# Patient Record
Sex: MALE | Race: WHITE | NOT HISPANIC OR LATINO | Employment: FULL TIME | URBAN - METROPOLITAN AREA
[De-identification: names, ages, dates, MRNs, and addresses within clinical notes are randomized per-mention and may not be internally consistent; named-entity substitution may affect disease eponyms.]

---

## 2017-08-11 ENCOUNTER — PREP FOR SURGERY (OUTPATIENT)
Dept: OTHER | Facility: HOSPITAL | Age: 62
End: 2017-08-11

## 2017-08-11 DIAGNOSIS — Z80.0 FAMILY HISTORY OF COLON CANCER: Primary | ICD-10-CM

## 2017-08-11 DIAGNOSIS — Z86.010 HISTORY OF COLON POLYPS: ICD-10-CM

## 2017-08-25 PROBLEM — Z80.0 FAMILY HISTORY OF COLON CANCER: Status: ACTIVE | Noted: 2017-08-25

## 2017-08-25 PROBLEM — Z86.010 HISTORY OF COLON POLYPS: Status: ACTIVE | Noted: 2017-08-25

## 2017-08-25 PROBLEM — Z86.0100 HISTORY OF COLON POLYPS: Status: ACTIVE | Noted: 2017-08-25

## 2017-11-29 ENCOUNTER — ANESTHESIA (OUTPATIENT)
Dept: GASTROENTEROLOGY | Facility: HOSPITAL | Age: 62
End: 2017-11-29

## 2017-11-29 ENCOUNTER — HOSPITAL ENCOUNTER (OUTPATIENT)
Facility: HOSPITAL | Age: 62
Setting detail: HOSPITAL OUTPATIENT SURGERY
Discharge: HOME | End: 2017-11-29
Attending: SURGERY | Admitting: SURGERY

## 2017-11-29 ENCOUNTER — ANESTHESIA EVENT (OUTPATIENT)
Dept: GASTROENTEROLOGY | Facility: HOSPITAL | Age: 62
End: 2017-11-29

## 2017-11-29 VITALS
HEIGHT: 69 IN | OXYGEN SATURATION: 97 % | DIASTOLIC BLOOD PRESSURE: 85 MMHG | HEART RATE: 55 BPM | RESPIRATION RATE: 16 BRPM | SYSTOLIC BLOOD PRESSURE: 128 MMHG | TEMPERATURE: 98.1 F | BODY MASS INDEX: 29.33 KG/M2 | WEIGHT: 198 LBS

## 2017-11-29 DIAGNOSIS — Z86.010 HISTORY OF COLON POLYPS: ICD-10-CM

## 2017-11-29 DIAGNOSIS — Z80.0 FAMILY HISTORY OF COLON CANCER: ICD-10-CM

## 2017-11-29 PROCEDURE — 88305 TISSUE EXAM BY PATHOLOGIST: CPT | Performed by: SURGERY

## 2017-11-29 PROCEDURE — 25010000002 PROPOFOL 10 MG/ML EMULSION: Performed by: ANESTHESIOLOGY

## 2017-11-29 PROCEDURE — 45385 COLONOSCOPY W/LESION REMOVAL: CPT | Performed by: SURGERY

## 2017-11-29 RX ORDER — LIDOCAINE HYDROCHLORIDE 20 MG/ML
INJECTION, SOLUTION INFILTRATION; PERINEURAL AS NEEDED
Status: DISCONTINUED | OUTPATIENT
Start: 2017-11-29 | End: 2017-11-29 | Stop reason: SURG

## 2017-11-29 RX ORDER — SODIUM CHLORIDE, SODIUM LACTATE, POTASSIUM CHLORIDE, CALCIUM CHLORIDE 600; 310; 30; 20 MG/100ML; MG/100ML; MG/100ML; MG/100ML
30 INJECTION, SOLUTION INTRAVENOUS CONTINUOUS PRN
Status: DISCONTINUED | OUTPATIENT
Start: 2017-11-29 | End: 2017-11-29 | Stop reason: HOSPADM

## 2017-11-29 RX ORDER — PROPOFOL 10 MG/ML
VIAL (ML) INTRAVENOUS AS NEEDED
Status: DISCONTINUED | OUTPATIENT
Start: 2017-11-29 | End: 2017-11-29 | Stop reason: SURG

## 2017-11-29 RX ADMIN — LIDOCAINE HYDROCHLORIDE 100 MG: 20 INJECTION, SOLUTION INFILTRATION; PERINEURAL at 10:42

## 2017-11-29 RX ADMIN — PROPOFOL 200 MG: 10 INJECTION, EMULSION INTRAVENOUS at 10:50

## 2017-11-29 RX ADMIN — PROPOFOL 200 MG: 10 INJECTION, EMULSION INTRAVENOUS at 10:45

## 2017-11-29 RX ADMIN — PROPOFOL 200 MG: 10 INJECTION, EMULSION INTRAVENOUS at 10:42

## 2017-11-29 RX ADMIN — SODIUM CHLORIDE, POTASSIUM CHLORIDE, SODIUM LACTATE AND CALCIUM CHLORIDE 30 ML/HR: 600; 310; 30; 20 INJECTION, SOLUTION INTRAVENOUS at 10:18

## 2017-11-29 NOTE — ANESTHESIA PREPROCEDURE EVALUATION
Anesthesia Evaluation     Patient summary reviewed and Nursing notes reviewed   NPO Solid Status: > 8 hours  NPO Liquid Status: > 4 hours     Airway   Mallampati: I  TM distance: >3 FB  Neck ROM: full  no difficulty expected  Dental - normal exam     Pulmonary - negative pulmonary ROS and normal exam   Cardiovascular - negative cardio ROS and normal exam        Neuro/Psych- negative ROS  GI/Hepatic/Renal/Endo - negative ROS     Musculoskeletal (-) negative ROS    Abdominal  - normal exam    Bowel sounds: normal.   Substance History - negative use     OB/GYN negative ob/gyn ROS         Other                                      Anesthesia Plan    ASA 1     MAC     Anesthetic plan and risks discussed with patient.

## 2017-11-29 NOTE — ANESTHESIA POSTPROCEDURE EVALUATION
"Patient: Ko Cuevas    Procedure Summary     Date Anesthesia Start Anesthesia Stop Room / Location    11/29/17 1038 1059  [facility] /  [facility]       Procedure Diagnosis Surgeon Provider    COLONOSCOPY to CECUM AND TI with HOT SNARE POLYPECTOMY  (N/A ) Family history of colon cancer; History of colon polyps  (Family history of colon cancer [Z80.0]; History of colon polyps [Z86.010]) MD Kimberly Douglass MD          Anesthesia Type: MAC  Last vitals  BP   128/85 (11/29/17 1120)   Temp   36.7 °C (98.1 °F) (11/29/17 1012)   Pulse   55 (11/29/17 1120)   Resp   16 (11/29/17 1120)     SpO2   97 % (11/29/17 1120)     Post Anesthesia Care and Evaluation    Patient location during evaluation: PACU  Patient participation: complete - patient participated  Level of consciousness: awake  Pain score: 0  Pain management: adequate  Airway patency: patent  Anesthetic complications: No anesthetic complications    Cardiovascular status: acceptable  Respiratory status: acceptable  Hydration status: acceptable    Comments: Blood pressure 128/85, pulse 55, temperature 36.7 °C (98.1 °F), temperature source Oral, resp. rate 16, height 68.5\" (174 cm), weight 198 lb (89.8 kg), SpO2 97 %.    No anesthesia care post op    "

## 2017-11-30 LAB
LAB AP CASE REPORT: NORMAL
Lab: NORMAL
PATH REPORT.FINAL DX SPEC: NORMAL
PATH REPORT.GROSS SPEC: NORMAL

## 2017-12-04 ENCOUNTER — DOCUMENTATION (OUTPATIENT)
Dept: SURGERY | Facility: CLINIC | Age: 62
End: 2017-12-04

## 2017-12-04 NOTE — PROGRESS NOTES
Colonoscopy 11/29/17 (performed secondary to personal history of polyps and family history of colon cancer in father) demonstrated sigmoid colon polyp.  Pathology tubular adenoma. Recommendation: five year surveillance

## 2017-12-05 ENCOUNTER — TELEPHONE (OUTPATIENT)
Dept: SURGERY | Facility: CLINIC | Age: 62
End: 2017-12-05

## 2017-12-05 NOTE — TELEPHONE ENCOUNTER
----- Message from Jose Roberto Allan MD sent at 12/4/2017 12:07 PM EST -----  Please inform him that he had a single benign polyp, five year follow up recommended-put in computer for reminder

## 2020-12-24 ENCOUNTER — HOSPITAL ENCOUNTER (OUTPATIENT)
Dept: CARDIOLOGY | Facility: HOSPITAL | Age: 65
Discharge: HOME | End: 2020-12-24

## 2020-12-24 VITALS — HEIGHT: 69 IN | BODY MASS INDEX: 30.04 KG/M2 | WEIGHT: 202.82 LBS

## 2020-12-24 VITALS
HEIGHT: 69 IN | SYSTOLIC BLOOD PRESSURE: 140 MMHG | HEART RATE: 49 BPM | OXYGEN SATURATION: 98 % | WEIGHT: 202 LBS | DIASTOLIC BLOOD PRESSURE: 73 MMHG | TEMPERATURE: 97.5 F | BODY MASS INDEX: 29.92 KG/M2

## 2020-12-24 DIAGNOSIS — R06.02 SHORTNESS OF BREATH: ICD-10-CM

## 2020-12-24 DIAGNOSIS — R07.2 PRECORDIAL PAIN: Primary | ICD-10-CM

## 2020-12-24 DIAGNOSIS — R07.9 CHEST PAIN, UNSPECIFIED TYPE: ICD-10-CM

## 2020-12-24 LAB
ALBUMIN SERPL-MCNC: 4 G/DL (ref 3.5–5.2)
ALBUMIN/GLOB SERPL: 1.7 G/DL
ALP SERPL-CCNC: 59 U/L (ref 39–117)
ALT SERPL W P-5'-P-CCNC: 22 U/L (ref 1–41)
ANION GAP SERPL CALCULATED.3IONS-SCNC: 7.2 MMOL/L (ref 5–15)
AST SERPL-CCNC: 13 U/L (ref 1–40)
BASOPHILS # BLD AUTO: 0.04 10*3/MM3 (ref 0–0.2)
BASOPHILS NFR BLD AUTO: 0.6 % (ref 0–1.5)
BH CV NUCLEAR PRIOR STUDY: 3
BH CV STRESS BP STAGE 1: NORMAL
BH CV STRESS BP STAGE 2: NORMAL
BH CV STRESS BP STAGE 3: NORMAL
BH CV STRESS BP STAGE 4: NORMAL
BH CV STRESS DURATION MIN STAGE 1: 3
BH CV STRESS DURATION MIN STAGE 2: 3
BH CV STRESS DURATION MIN STAGE 3: 3
BH CV STRESS DURATION SEC STAGE 1: 0
BH CV STRESS DURATION SEC STAGE 2: 0
BH CV STRESS DURATION SEC STAGE 3: 0
BH CV STRESS GRADE STAGE 1: 10
BH CV STRESS GRADE STAGE 2: 12
BH CV STRESS GRADE STAGE 3: 14
BH CV STRESS HR STAGE 1: 86
BH CV STRESS HR STAGE 2: 121
BH CV STRESS HR STAGE 3: 140
BH CV STRESS METS STAGE 1: 5
BH CV STRESS METS STAGE 2: 7.5
BH CV STRESS METS STAGE 3: 10
BH CV STRESS PROTOCOL 1: NORMAL
BH CV STRESS RECOVERY BP: NORMAL MMHG
BH CV STRESS RECOVERY HR: 85 BPM
BH CV STRESS SPEED STAGE 1: 1.7
BH CV STRESS SPEED STAGE 2: 2.5
BH CV STRESS SPEED STAGE 3: 3.4
BH CV STRESS STAGE 1: 1
BH CV STRESS STAGE 2: 2
BH CV STRESS STAGE 3: 3
BH CV STRESS STAGE 4: NORMAL
BILIRUB SERPL-MCNC: 0.6 MG/DL (ref 0–1.2)
BUN SERPL-MCNC: 23 MG/DL (ref 8–23)
BUN/CREAT SERPL: 24.2 (ref 7–25)
CALCIUM SPEC-SCNC: 8.8 MG/DL (ref 8.6–10.5)
CHLORIDE SERPL-SCNC: 106 MMOL/L (ref 98–107)
CO2 SERPL-SCNC: 24.8 MMOL/L (ref 22–29)
CREAT SERPL-MCNC: 0.95 MG/DL (ref 0.76–1.27)
D DIMER PPP FEU-MCNC: <0.27 MCGFEU/ML (ref 0–0.49)
DEPRECATED RDW RBC AUTO: 39.7 FL (ref 37–54)
EOSINOPHIL # BLD AUTO: 0.25 10*3/MM3 (ref 0–0.4)
EOSINOPHIL NFR BLD AUTO: 3.6 % (ref 0.3–6.2)
ERYTHROCYTE [DISTWIDTH] IN BLOOD BY AUTOMATED COUNT: 12.7 % (ref 12.3–15.4)
GFR SERPL CREATININE-BSD FRML MDRD: 79 ML/MIN/1.73
GLOBULIN UR ELPH-MCNC: 2.3 GM/DL
GLUCOSE SERPL-MCNC: 104 MG/DL (ref 65–99)
HCT VFR BLD AUTO: 43.4 % (ref 37.5–51)
HGB BLD-MCNC: 15.3 G/DL (ref 13–17.7)
IMM GRANULOCYTES # BLD AUTO: 0.11 10*3/MM3 (ref 0–0.05)
IMM GRANULOCYTES NFR BLD AUTO: 1.6 % (ref 0–0.5)
LV EF NUC BP: 58 %
LYMPHOCYTES # BLD AUTO: 1.18 10*3/MM3 (ref 0.7–3.1)
LYMPHOCYTES NFR BLD AUTO: 17.2 % (ref 19.6–45.3)
MAXIMAL PREDICTED HEART RATE: 154 BPM
MCH RBC QN AUTO: 30.7 PG (ref 26.6–33)
MCHC RBC AUTO-ENTMCNC: 35.3 G/DL (ref 31.5–35.7)
MCV RBC AUTO: 87.1 FL (ref 79–97)
MONOCYTES # BLD AUTO: 0.65 10*3/MM3 (ref 0.1–0.9)
MONOCYTES NFR BLD AUTO: 9.5 % (ref 5–12)
NEUTROPHILS NFR BLD AUTO: 4.64 10*3/MM3 (ref 1.7–7)
NEUTROPHILS NFR BLD AUTO: 67.5 % (ref 42.7–76)
NRBC BLD AUTO-RTO: 0 /100 WBC (ref 0–0.2)
NT-PROBNP SERPL-MCNC: 43 PG/ML (ref 0–900)
PERCENT MAX PREDICTED HR: 90.91 %
PLATELET # BLD AUTO: 172 10*3/MM3 (ref 140–450)
PMV BLD AUTO: 11.4 FL (ref 6–12)
POTASSIUM SERPL-SCNC: 4.5 MMOL/L (ref 3.5–5.2)
PROT SERPL-MCNC: 6.3 G/DL (ref 6–8.5)
RBC # BLD AUTO: 4.98 10*6/MM3 (ref 4.14–5.8)
SODIUM SERPL-SCNC: 138 MMOL/L (ref 136–145)
STRESS BASELINE BP: NORMAL MMHG
STRESS BASELINE HR: 52 BPM
STRESS PERCENT HR: 107 %
STRESS POST ESTIMATED WORKLOAD: 10 METS
STRESS POST EXERCISE DUR MIN: 9 MIN
STRESS POST EXERCISE DUR SEC: 0 SEC
STRESS POST PEAK BP: NORMAL MMHG
STRESS POST PEAK HR: 140 BPM
STRESS TARGET HR: 131 BPM
TROPONIN T SERPL-MCNC: <0.01 NG/ML (ref 0–0.03)
WBC # BLD AUTO: 6.87 10*3/MM3 (ref 3.4–10.8)

## 2020-12-24 PROCEDURE — 94760 N-INVAS EAR/PLS OXIMETRY 1: CPT

## 2020-12-24 PROCEDURE — 83880 ASSAY OF NATRIURETIC PEPTIDE: CPT | Performed by: INTERNAL MEDICINE

## 2020-12-24 PROCEDURE — 93010 ELECTROCARDIOGRAM REPORT: CPT | Performed by: INTERNAL MEDICINE

## 2020-12-24 PROCEDURE — 99204 OFFICE O/P NEW MOD 45 MIN: CPT | Performed by: INTERNAL MEDICINE

## 2020-12-24 PROCEDURE — A9502 TC99M TETROFOSMIN: HCPCS | Performed by: INTERNAL MEDICINE

## 2020-12-24 PROCEDURE — 80053 COMPREHEN METABOLIC PANEL: CPT

## 2020-12-24 PROCEDURE — 93016 CV STRESS TEST SUPVJ ONLY: CPT | Performed by: INTERNAL MEDICINE

## 2020-12-24 PROCEDURE — 36415 COLL VENOUS BLD VENIPUNCTURE: CPT

## 2020-12-24 PROCEDURE — 93017 CV STRESS TEST TRACING ONLY: CPT

## 2020-12-24 PROCEDURE — 93005 ELECTROCARDIOGRAM TRACING: CPT | Performed by: INTERNAL MEDICINE

## 2020-12-24 PROCEDURE — 0 TECHNETIUM TETROFOSMIN KIT: Performed by: INTERNAL MEDICINE

## 2020-12-24 PROCEDURE — 78452 HT MUSCLE IMAGE SPECT MULT: CPT | Performed by: INTERNAL MEDICINE

## 2020-12-24 PROCEDURE — 84484 ASSAY OF TROPONIN QUANT: CPT | Performed by: INTERNAL MEDICINE

## 2020-12-24 PROCEDURE — 85025 COMPLETE CBC W/AUTO DIFF WBC: CPT

## 2020-12-24 PROCEDURE — 85379 FIBRIN DEGRADATION QUANT: CPT | Performed by: INTERNAL MEDICINE

## 2020-12-24 PROCEDURE — 78452 HT MUSCLE IMAGE SPECT MULT: CPT

## 2020-12-24 PROCEDURE — 93018 CV STRESS TEST I&R ONLY: CPT | Performed by: INTERNAL MEDICINE

## 2020-12-24 RX ORDER — SODIUM CHLORIDE 0.9 % (FLUSH) 0.9 %
10 SYRINGE (ML) INJECTION AS NEEDED
Status: SHIPPED | OUTPATIENT
Start: 2020-12-24

## 2020-12-24 RX ORDER — NITROGLYCERIN 0.4 MG/1
0.4 TABLET SUBLINGUAL
Status: SHIPPED | OUTPATIENT
Start: 2020-12-24

## 2020-12-24 RX ORDER — FAMOTIDINE 20 MG/1
20 TABLET, FILM COATED ORAL 2 TIMES DAILY
COMMUNITY

## 2020-12-24 RX ADMIN — TETROFOSMIN 1 DOSE: 1.38 INJECTION, POWDER, LYOPHILIZED, FOR SOLUTION INTRAVENOUS at 10:44

## 2020-12-24 RX ADMIN — TETROFOSMIN 1 DOSE: 1.38 INJECTION, POWDER, LYOPHILIZED, FOR SOLUTION INTRAVENOUS at 11:28

## 2020-12-24 NOTE — PROGRESS NOTES
Date of Office Visit: 2020  Encounter Provider: Lit Murrell MD  Place of Service: [facility] CARDIOLOGY  Patient Name: Ko Cuevas  :1955    Chief complaint: Chest pain    History of Present Illness:    I had the pleasure of seeing the patient in our [facility] unit on 2020.  He is a very pleasant 65 year-old with a history of hypertension, tobacco use, and family history of coronary artery disease who presents for evaluation.  The patient states that for the last several weeks, he has had intermittent chest discomfort which she describes as a sharp pain in his left upper chest, and occasionally in the right upper chest.  He mainly feels it when he is not active, although it was fairly intense yesterday, and lasted for most of the day.  He has also had some mild worsening dyspnea on exertion.  He has smoked intermittently for many years, but quit 2 weeks ago.  His father had 2 separate CABG surgeries, with his first in his 60s.  His father also had CHF.  His EKG does not show any ischemic changes, although he does have a first-degree AV block.  He has not had prior cardiac testing.    Past Medical History:   Diagnosis Date   • Heart murmur    • Hypertension        Past Surgical History:   Procedure Laterality Date   • COLONOSCOPY     • COLONOSCOPY N/A 2017    Procedure: COLONOSCOPY to CECUM AND TI with HOT SNARE POLYPECTOMY ;  Surgeon: Jose Roberto Allan MD;  Location: [facility] ENDOSCOPY;  Service:        Current Outpatient Medications on File Prior to Encounter   Medication Sig Dispense Refill   • famotidine (PEPCID) 20 MG tablet Take 20 mg by mouth 2 (Two) Times a Day.       No current facility-administered medications on file prior to encounter.      Allergies as of 2020 - Reviewed 2020   Allergen Reaction Noted   • Tea tree oil Itching 2020     Social History     Socioeconomic History   • Marital status:      Spouse name: Not on  "file   • Number of children: Not on file   • Years of education: Not on file   • Highest education level: Not on file   Tobacco Use   • Smoking status: Current Some Day Smoker     Packs/day: 0.25   • Smokeless tobacco: Never Used   Substance and Sexual Activity   • Alcohol use: Yes     Alcohol/week: 8.0 standard drinks     Types: 8 Shots of liquor per week   • Drug use: Never   • Sexual activity: Defer     Family History   Problem Relation Age of Onset   • Colon cancer Father    • Heart failure Father    • Coronary artery disease Father         Father with 2 separate CABG surgeries, the first in his 60s   • Liver disease Mother    • Diabetes Sister    • Clotting disorder Brother    • No Known Problems Sister    • No Known Problems Sister    • No Known Problems Sister        Review of Systems   Cardiovascular: Positive for chest pain.   All other systems reviewed and are negative.     Objective:     Vitals:    12/24/20 0855   BP: 140/73   BP Location: Right arm   Patient Position: Sitting   Pulse: (!) 49   Temp: 97.5 °F (36.4 °C)   TempSrc: Temporal   SpO2: 98%   Weight: 91.6 kg (202 lb)   Height: 175.3 cm (69\")     Body mass index is 29.83 kg/m².    Constitutional:       Appearance: Healthy appearance. Well-developed.   Eyes:      Conjunctiva/sclera: Conjunctivae normal.   HENT:      Head: Normocephalic and atraumatic.   Neck:      Musculoskeletal: Neck supple.   Pulmonary:      Effort: Pulmonary effort is normal.      Breath sounds: Normal breath sounds.   Cardiovascular:      Normal rate. Regular rhythm.      Murmurs: There is no murmur.      No gallop.   Abdominal:      Palpations: Abdomen is soft.      Tenderness: There is no abdominal tenderness.   Skin:     General: Skin is warm.   Neurological:      Mental Status: Alert and oriented to person, place, and time.   Psychiatric:         Behavior: Behavior normal.       Lab Review:     ECG 12 Lead    Date/Time: 12/24/2020 11:30 AM  Performed by: Lit Murrell " MD BRIGID  Authorized by: Lit Murrell MD   Comparison: not compared with previous ECG   Previous ECG: no previous ECG available  Rhythm: sinus bradycardia  Rate: bradycardic  BPM: 49  Conduction: 1st degree AV block    Clinical impression: abnormal EKG              Assessment:       Diagnosis Plan   1. Chest pain, unspecified type  Cardiac Monitoring    Vital Signs - Once    Vital Signs - As Needed    Pulse Oximetry    Oxygen Therapy- Nasal Cannula; Titrate for SPO2: 92%, equal to or greater than    Insert Peripheral IV    sodium chloride 0.9 % flush 10 mL    nitroglycerin (NITROSTAT) SL tablet 0.4 mg    NPO Diet    Bathroom Privileges With Assistance    CBC & Differential    Comprehensive Metabolic Panel    Troponin T    D-Dimer    proBNP    ECG 12 Lead    Cardiac Monitoring    Vital Signs - Once    Insert Peripheral IV    NPO Diet    Bathroom Privileges With Assistance    Troponin T    Troponin T    D-Dimer    D-Dimer    proBNP    proBNP    CBC Auto Differential    Stress Test With Myocardial Perfusion One Day   2. Shortness of breath  Cardiac Monitoring    Vital Signs - Once    Vital Signs - As Needed    Pulse Oximetry    Oxygen Therapy- Nasal Cannula; Titrate for SPO2: 92%, equal to or greater than    Insert Peripheral IV    sodium chloride 0.9 % flush 10 mL    nitroglycerin (NITROSTAT) SL tablet 0.4 mg    NPO Diet    Bathroom Privileges With Assistance    CBC & Differential    Comprehensive Metabolic Panel    Troponin T    D-Dimer    proBNP    ECG 12 Lead    Cardiac Monitoring    Vital Signs - Once    Insert Peripheral IV    NPO Diet    Bathroom Privileges With Assistance    Troponin T    Troponin T    D-Dimer    D-Dimer    proBNP    proBNP    CBC Auto Differential    Stress Test With Myocardial Perfusion One Day     Plan:       He has risk factors for coronary artery disease, including a family history and intermittent long-term smoking.  His symptoms are somewhat atypical, although he clearly needs to be  evaluated.  I have recommended an exercise nuclear stress test at this point.  The patient was in agreement with this plan.  Further plans will be made pending the results of the test.

## 2020-12-24 NOTE — ADDENDUM NOTE
Encounter addended by: Lexi Harrington RN on: 12/24/2020 12:43 PM   Actions taken: Flowsheet accepted

## 2021-01-21 ENCOUNTER — HOSPITAL ENCOUNTER (OUTPATIENT)
Dept: CARDIOLOGY | Facility: HOSPITAL | Age: 66
Discharge: HOME | End: 2021-01-21
Admitting: INTERNAL MEDICINE

## 2021-01-21 VITALS
WEIGHT: 203 LBS | DIASTOLIC BLOOD PRESSURE: 74 MMHG | BODY MASS INDEX: 30.07 KG/M2 | SYSTOLIC BLOOD PRESSURE: 150 MMHG | HEIGHT: 69 IN | HEART RATE: 73 BPM

## 2021-01-21 DIAGNOSIS — R07.2 PRECORDIAL PAIN: ICD-10-CM

## 2021-01-21 PROCEDURE — 93306 TTE W/DOPPLER COMPLETE: CPT

## 2021-01-21 PROCEDURE — 93306 TTE W/DOPPLER COMPLETE: CPT | Performed by: INTERNAL MEDICINE

## 2021-01-22 LAB
AORTIC ARCH: 2.9 CM
ASCENDING AORTA: 2.7 CM
BH CV ECHO MEAS - ACS: 2.2 CM
BH CV ECHO MEAS - AO ROOT AREA (BSA CORRECTED): 1.6
BH CV ECHO MEAS - AO ROOT AREA: 8.4 CM^2
BH CV ECHO MEAS - AO ROOT DIAM: 3.3 CM
BH CV ECHO MEAS - ASC AORTA: 3.7 CM
BH CV ECHO MEAS - BSA(HAYCOCK): 2.1 M^2
BH CV ECHO MEAS - BSA: 2.1 M^2
BH CV ECHO MEAS - BZI_BMI: 30 KILOGRAMS/M^2
BH CV ECHO MEAS - BZI_METRIC_HEIGHT: 175.3 CM
BH CV ECHO MEAS - BZI_METRIC_WEIGHT: 92.1 KG
BH CV ECHO MEAS - EDV(MOD-SP2): 77 ML
BH CV ECHO MEAS - EDV(MOD-SP4): 87 ML
BH CV ECHO MEAS - EDV(TEICH): 126.2 ML
BH CV ECHO MEAS - EF(CUBED): 79.1 %
BH CV ECHO MEAS - EF(MOD-BP): 57 %
BH CV ECHO MEAS - EF(MOD-SP2): 57.1 %
BH CV ECHO MEAS - EF(MOD-SP4): 56.3 %
BH CV ECHO MEAS - EF(TEICH): 71.1 %
BH CV ECHO MEAS - ESV(MOD-SP2): 33 ML
BH CV ECHO MEAS - ESV(MOD-SP4): 38 ML
BH CV ECHO MEAS - ESV(TEICH): 36.4 ML
BH CV ECHO MEAS - FS: 40.7 %
BH CV ECHO MEAS - IVS/LVPW: 0.98
BH CV ECHO MEAS - IVSD: 1.3 CM
BH CV ECHO MEAS - LAT PEAK E' VEL: 8.9 CM/SEC
BH CV ECHO MEAS - LV DIASTOLIC VOL/BSA (35-75): 41.9 ML/M^2
BH CV ECHO MEAS - LV MASS(C)D: 265.1 GRAMS
BH CV ECHO MEAS - LV MASS(C)DI: 127.5 GRAMS/M^2
BH CV ECHO MEAS - LV SYSTOLIC VOL/BSA (12-30): 18.3 ML/M^2
BH CV ECHO MEAS - LVIDD: 5.1 CM
BH CV ECHO MEAS - LVIDS: 3.1 CM
BH CV ECHO MEAS - LVLD AP2: 7.4 CM
BH CV ECHO MEAS - LVLD AP4: 6.9 CM
BH CV ECHO MEAS - LVLS AP2: 7.3 CM
BH CV ECHO MEAS - LVLS AP4: 7.1 CM
BH CV ECHO MEAS - LVOT AREA (M): 3.1 CM^2
BH CV ECHO MEAS - LVOT AREA: 3 CM^2
BH CV ECHO MEAS - LVOT DIAM: 2 CM
BH CV ECHO MEAS - LVPWD: 1.3 CM
BH CV ECHO MEAS - MED PEAK E' VEL: 6.7 CM/SEC
BH CV ECHO MEAS - MR MAX PG: 30.1 MMHG
BH CV ECHO MEAS - MR MAX VEL: 274.2 CM/SEC
BH CV ECHO MEAS - MV A DUR: 0.17 SEC
BH CV ECHO MEAS - MV A MAX VEL: 83.6 CM/SEC
BH CV ECHO MEAS - MV DEC SLOPE: 224 CM/SEC^2
BH CV ECHO MEAS - MV DEC TIME: 0.23 SEC
BH CV ECHO MEAS - MV E MAX VEL: 81.8 CM/SEC
BH CV ECHO MEAS - MV E/A: 0.98
BH CV ECHO MEAS - MV MAX PG: 3.7 MMHG
BH CV ECHO MEAS - MV MEAN PG: 1.3 MMHG
BH CV ECHO MEAS - MV P1/2T MAX VEL: 89.6 CM/SEC
BH CV ECHO MEAS - MV P1/2T: 117.2 MSEC
BH CV ECHO MEAS - MV V2 MAX: 96.4 CM/SEC
BH CV ECHO MEAS - MV V2 MEAN: 51.2 CM/SEC
BH CV ECHO MEAS - MV V2 VTI: 31.5 CM
BH CV ECHO MEAS - MVA P1/2T LCG: 2.5 CM^2
BH CV ECHO MEAS - MVA(P1/2T): 1.9 CM^2
BH CV ECHO MEAS - PA ACC TIME: 0.09 SEC
BH CV ECHO MEAS - PA MAX PG (FULL): 7.9 MMHG
BH CV ECHO MEAS - PA MAX PG: 10.1 MMHG
BH CV ECHO MEAS - PA PR(ACCEL): 39.4 MMHG
BH CV ECHO MEAS - PA V2 MAX: 158.8 CM/SEC
BH CV ECHO MEAS - PULM A REVS DUR: 0.12 SEC
BH CV ECHO MEAS - PULM A REVS VEL: 23 CM/SEC
BH CV ECHO MEAS - PULM DIAS VEL: 47.3 CM/SEC
BH CV ECHO MEAS - PULM S/D: 0.8
BH CV ECHO MEAS - PULM SYS VEL: 37.6 CM/SEC
BH CV ECHO MEAS - PVA(V,A): 1.5 CM^2
BH CV ECHO MEAS - PVA(V,D): 1.5 CM^2
BH CV ECHO MEAS - RAP SYSTOLE: 3 MMHG
BH CV ECHO MEAS - RV MAX PG: 2.2 MMHG
BH CV ECHO MEAS - RV MEAN PG: 1.6 MMHG
BH CV ECHO MEAS - RV V1 MAX: 74.1 CM/SEC
BH CV ECHO MEAS - RV V1 MEAN: 62.2 CM/SEC
BH CV ECHO MEAS - RV V1 VTI: 16.2 CM
BH CV ECHO MEAS - RVOT AREA: 3.1 CM^2
BH CV ECHO MEAS - RVOT DIAM: 2 CM
BH CV ECHO MEAS - RVSP: 18 MMHG
BH CV ECHO MEAS - SI(CUBED): 51.8 ML/M^2
BH CV ECHO MEAS - SI(MOD-SP2): 21.2 ML/M^2
BH CV ECHO MEAS - SI(MOD-SP4): 23.6 ML/M^2
BH CV ECHO MEAS - SI(TEICH): 43.2 ML/M^2
BH CV ECHO MEAS - SUP REN AO DIAM: 2.3 CM
BH CV ECHO MEAS - SV(CUBED): 107.7 ML
BH CV ECHO MEAS - SV(MOD-SP2): 44 ML
BH CV ECHO MEAS - SV(MOD-SP4): 49 ML
BH CV ECHO MEAS - SV(RVOT): 50.6 ML
BH CV ECHO MEAS - SV(TEICH): 89.8 ML
BH CV ECHO MEAS - TAPSE (>1.6): 3.3 CM
BH CV ECHO MEAS - TR MAX VEL: 195.5 CM/SEC
BH CV ECHO MEASUREMENTS AVERAGE E/E' RATIO: 10.49
BH CV XLRA - RV BASE: 2.9 CM
BH CV XLRA - RV LENGTH: 7 CM
BH CV XLRA - RV MID: 2.7 CM
BH CV XLRA - TDI S': 14.7 CM/SEC
LEFT ATRIUM VOLUME INDEX: 21 ML/M2
MAXIMAL PREDICTED HEART RATE: 154 BPM
SINUS: 3 CM
STJ: 2.8 CM
STRESS TARGET HR: 131 BPM

## 2021-01-25 DIAGNOSIS — I77.810 ASCENDING AORTA DILATATION (HCC): Primary | ICD-10-CM

## 2021-01-25 NOTE — PROGRESS NOTES
I called and gave him the results.  The echocardiogram looks good other than a mildly dilated aorta.  I am going to check a CT angiogram of the chest.  He also told me that his blood pressure has been running higher recently with systolic readings in the 140s.  He wants to see if this continues before starting on medications.  I told him that ideally, it should be less than 130.  He is going to wait on medications for now, and we can discuss this further after his CT scan.    BHARGAVI

## 2021-02-19 ENCOUNTER — HOSPITAL ENCOUNTER (OUTPATIENT)
Dept: CT IMAGING | Facility: HOSPITAL | Age: 66
Discharge: HOME | End: 2021-02-19
Admitting: INTERNAL MEDICINE

## 2021-02-19 LAB — CREAT BLDA-MCNC: 1.1 MG/DL (ref 0.6–1.3)

## 2021-02-19 PROCEDURE — 82565 ASSAY OF CREATININE: CPT

## 2021-02-19 PROCEDURE — 0 IOPAMIDOL PER 1 ML: Performed by: INTERNAL MEDICINE

## 2021-02-19 PROCEDURE — 71275 CT ANGIOGRAPHY CHEST: CPT

## 2021-02-19 RX ADMIN — IOPAMIDOL 95 ML: 755 INJECTION, SOLUTION INTRAVENOUS at 16:45

## 2021-02-22 DIAGNOSIS — N28.9 KIDNEY LESION, NATIVE, LEFT: Primary | ICD-10-CM

## 2021-03-02 ENCOUNTER — TRANSCRIBE ORDERS (OUTPATIENT)
Dept: ADMINISTRATIVE | Facility: HOSPITAL | Age: 66
End: 2021-03-02

## 2021-03-02 DIAGNOSIS — N28.1 RENAL CYST: Primary | ICD-10-CM

## 2021-03-16 ENCOUNTER — BULK ORDERING (OUTPATIENT)
Dept: CASE MANAGEMENT | Facility: OTHER | Age: 66
End: 2021-03-16

## 2021-03-16 DIAGNOSIS — Z23 IMMUNIZATION DUE: ICD-10-CM

## 2021-03-29 ENCOUNTER — HOSPITAL ENCOUNTER (OUTPATIENT)
Dept: ULTRASOUND IMAGING | Facility: HOSPITAL | Age: 66
Discharge: HOME | End: 2021-03-29
Admitting: INTERNAL MEDICINE

## 2021-03-29 DIAGNOSIS — N28.1 RENAL CYST: ICD-10-CM

## 2021-03-29 PROCEDURE — 76775 US EXAM ABDO BACK WALL LIM: CPT

## 2023-01-16 ENCOUNTER — PREP FOR SURGERY (OUTPATIENT)
Dept: OTHER | Facility: HOSPITAL | Age: 68
End: 2023-01-16
Payer: MEDICARE

## 2023-01-16 DIAGNOSIS — Z80.0 FAMILY HISTORY OF COLON CANCER: Primary | ICD-10-CM

## 2023-01-16 DIAGNOSIS — Z86.010 HISTORY OF ADENOMATOUS POLYP OF COLON: ICD-10-CM

## 2023-05-09 RX ORDER — VALSARTAN 80 MG/1
80 TABLET ORAL DAILY
COMMUNITY

## 2023-05-10 ENCOUNTER — HOSPITAL ENCOUNTER (OUTPATIENT)
Facility: HOSPITAL | Age: 68
Setting detail: HOSPITAL OUTPATIENT SURGERY
Discharge: HOME | End: 2023-05-10
Attending: SURGERY | Admitting: SURGERY
Payer: MEDICARE

## 2023-05-10 ENCOUNTER — ANESTHESIA EVENT (OUTPATIENT)
Dept: GASTROENTEROLOGY | Facility: HOSPITAL | Age: 68
End: 2023-05-10
Payer: MEDICARE

## 2023-05-10 ENCOUNTER — ANESTHESIA (OUTPATIENT)
Dept: GASTROENTEROLOGY | Facility: HOSPITAL | Age: 68
End: 2023-05-10
Payer: MEDICARE

## 2023-05-10 VITALS
SYSTOLIC BLOOD PRESSURE: 121 MMHG | HEIGHT: 68 IN | WEIGHT: 199 LBS | BODY MASS INDEX: 30.16 KG/M2 | DIASTOLIC BLOOD PRESSURE: 70 MMHG | TEMPERATURE: 98.1 F | OXYGEN SATURATION: 100 % | RESPIRATION RATE: 16 BRPM | HEART RATE: 51 BPM

## 2023-05-10 DIAGNOSIS — Z80.0 FAMILY HISTORY OF COLON CANCER: ICD-10-CM

## 2023-05-10 DIAGNOSIS — Z86.010 HISTORY OF ADENOMATOUS POLYP OF COLON: ICD-10-CM

## 2023-05-10 PROBLEM — Z86.0100 HISTORY OF COLON POLYPS: Status: ACTIVE | Noted: 2017-08-25

## 2023-05-10 PROCEDURE — 25010000002 PROPOFOL 10 MG/ML EMULSION: Performed by: NURSE ANESTHETIST, CERTIFIED REGISTERED

## 2023-05-10 PROCEDURE — 88305 TISSUE EXAM BY PATHOLOGIST: CPT | Performed by: SURGERY

## 2023-05-10 PROCEDURE — 45385 COLONOSCOPY W/LESION REMOVAL: CPT | Performed by: SURGERY

## 2023-05-10 PROCEDURE — S0260 H&P FOR SURGERY: HCPCS | Performed by: SURGERY

## 2023-05-10 PROCEDURE — 25010000002 PHENYLEPHRINE 10 MG/ML SOLUTION: Performed by: NURSE ANESTHETIST, CERTIFIED REGISTERED

## 2023-05-10 RX ORDER — PROPOFOL 10 MG/ML
VIAL (ML) INTRAVENOUS AS NEEDED
Status: DISCONTINUED | OUTPATIENT
Start: 2023-05-10 | End: 2023-05-10 | Stop reason: SURG

## 2023-05-10 RX ORDER — PHENYLEPHRINE HYDROCHLORIDE 10 MG/ML
INJECTION INTRAVENOUS AS NEEDED
Status: DISCONTINUED | OUTPATIENT
Start: 2023-05-10 | End: 2023-05-10 | Stop reason: SURG

## 2023-05-10 RX ORDER — SODIUM CHLORIDE, SODIUM LACTATE, POTASSIUM CHLORIDE, CALCIUM CHLORIDE 600; 310; 30; 20 MG/100ML; MG/100ML; MG/100ML; MG/100ML
30 INJECTION, SOLUTION INTRAVENOUS CONTINUOUS
Status: DISCONTINUED | OUTPATIENT
Start: 2023-05-10 | End: 2023-05-10 | Stop reason: HOSPADM

## 2023-05-10 RX ORDER — SODIUM CHLORIDE, SODIUM LACTATE, POTASSIUM CHLORIDE, CALCIUM CHLORIDE 600; 310; 30; 20 MG/100ML; MG/100ML; MG/100ML; MG/100ML
INJECTION, SOLUTION INTRAVENOUS CONTINUOUS PRN
Status: DISCONTINUED | OUTPATIENT
Start: 2023-05-10 | End: 2023-05-10 | Stop reason: SURG

## 2023-05-10 RX ADMIN — SODIUM CHLORIDE, POTASSIUM CHLORIDE, SODIUM LACTATE AND CALCIUM CHLORIDE: 600; 310; 30; 20 INJECTION, SOLUTION INTRAVENOUS at 09:44

## 2023-05-10 RX ADMIN — PROPOFOL 100 MG: 10 INJECTION, EMULSION INTRAVENOUS at 09:44

## 2023-05-10 RX ADMIN — SODIUM CHLORIDE, POTASSIUM CHLORIDE, SODIUM LACTATE AND CALCIUM CHLORIDE 30 ML/HR: 600; 310; 30; 20 INJECTION, SOLUTION INTRAVENOUS at 09:07

## 2023-05-10 RX ADMIN — PHENYLEPHRINE HYDROCHLORIDE 100 MCG: 10 INJECTION INTRAVENOUS at 09:57

## 2023-05-10 RX ADMIN — PROPOFOL 200 MCG/KG/MIN: 10 INJECTION, EMULSION INTRAVENOUS at 09:44

## 2023-05-10 NOTE — ANESTHESIA POSTPROCEDURE EVALUATION
Patient: Ko Cuevas    Procedure Summary     Date: 05/10/23 Room / Location: [facility] 1 / [facility]    Anesthesia Start: 0939 Anesthesia Stop: 1005    Procedure: COLONOSCOPY TO CECUM WITH COLD POLYPECTOMIES Diagnosis:       Family history of colon cancer      History of adenomatous polyp of colon      (Family history of colon cancer [Z80.0])      (History of adenomatous polyp of colon [Z86.010])    Surgeons: Jose Roberto Allan MD Provider: Matteo Cooley MD    Anesthesia Type: MAC ASA Status: 2          Anesthesia Type: MAC    Vitals  Vitals Value Taken Time   /64 05/10/23 1012   Temp 36.7 °C (98.1 °F) 05/10/23 1012   Pulse 53 05/10/23 1012   Resp 16 05/10/23 1012   SpO2 100 % 05/10/23 1012           Post Anesthesia Care and Evaluation    Patient location during evaluation: PACU  Patient participation: complete - patient participated  Level of consciousness: awake and alert  Pain management: adequate    Airway patency: patent  Anesthetic complications: No anesthetic complications    Cardiovascular status: acceptable  Respiratory status: acceptable  Hydration status: acceptable    Comments: ---------------------------               05/10/23                      1012         ---------------------------   BP:          117/64         Pulse:         53           Resp:          16           Temp:   36.7 °C (98.1 °F)   SpO2:         100%         ---------------------------

## 2023-05-10 NOTE — OP NOTE
PREOPERATIVE DIAGNOSIS:  • High risk screening secondary to personal history of adenomatous polyps and family history of colon cancer (father)    POSTOPERATIVE DIAGNOSIS AND FINDINGS:  • 2 small descending colon polyps  • 2 small rectal polyps    PROCEDURE:  Colonoscopy to terminal ileum with snare polypectomy x4    SURGEON:  Jose Roberto Allan MD    ANESTHESIA:  MAC    SPECIMEN(S):  • Polyps    DESCRIPTION:  In decubitus position digital rectal exam was normal. Colonoscope inserted under direct visualization of lumen to cecum confirmed by visualization of ileocecal valve and appendiceal orifice.  Ileocecal valve was intubated and terminal ileum was grossly normal.  Scope was slowly withdrawn circumferentially examining all mucosal surfaces.  Bowel preparation was good.  There were 2 small polyps in the descending colon removed completely with a cold snare and retrieved, good hemostasis at each site.  There were 2 small polyps in the rectum removed completely with a cold snare and retrieved, good hemostasis at each site.  Tolerated well.    RECOMMENDATION FOR FUTURE SURVEILLANCE:  To be determined based on polyp pathology and issued as separate report    Jose Roberto Allan M.D.

## 2023-05-10 NOTE — ANESTHESIA PREPROCEDURE EVALUATION
Anesthesia Evaluation     Patient summary reviewed and Nursing notes reviewed                Airway   Mallampati: II  TM distance: <3 FB  Neck ROM: full  Dental      Pulmonary    (+) a smoker Former,   Cardiovascular     ECG reviewed  Rhythm: regular  Rate: normal    (+) hypertension, valvular problems/murmurs murmur,       Neuro/Psych- negative ROS  GI/Hepatic/Renal/Endo - negative ROS     Musculoskeletal (-) negative ROS    Abdominal    Substance History - negative use     OB/GYN negative ob/gyn ROS         Other                        Anesthesia Plan    ASA 2     MAC     (I have reviewed the patient's history with the patient and the chart, including all pertinent laboratory results and imaging. I have explained the risks of anesthesia including but not limited to dental damage, corneal abrasion, nerve injury, MI, stroke, and death. Questions asked and answered. Anesthetic plan discussed with patient and team as indicated. Patient expressed understanding of the above.  )  intravenous induction     Anesthetic plan, risks, benefits, and alternatives have been provided, discussed and informed consent has been obtained with: patient.    Plan discussed with CRNA.        CODE STATUS:

## 2023-05-10 NOTE — DISCHARGE INSTRUCTIONS
For the next 24 hours patient needs to be with a responsible adult.    For 24 hours DO NOT drive, operate machinery, appliances, drink alcohol, make important decisions or sign legal documents.    Start with a light or bland diet if you are feeling sick to your stomach otherwise advance to regular diet as tolerated.    Follow recommendations on procedure report if provided by your doctor.    Call Dr Allan for problems . Problems may include but not limited to: large amounts of bleeding, trouble breathing, repeated vomiting, severe unrelieved pain, fever or chills.

## 2023-05-11 ENCOUNTER — DOCUMENTATION (OUTPATIENT)
Dept: SURGERY | Facility: CLINIC | Age: 68
End: 2023-05-11
Payer: MEDICARE

## 2023-05-11 LAB
LAB AP CASE REPORT: NORMAL
PATH REPORT.FINAL DX SPEC: NORMAL
PATH REPORT.GROSS SPEC: NORMAL

## 2023-05-11 NOTE — PROGRESS NOTES
ENDOSCOPY FOLLOW UP NOTE    Colonoscopy 5/10/2023    Indication:  • Personal history adenomatous polyps  • Family history colon cancer-father    Findings:  • 2 small descending colon polyps: Pathology-tubular adenomas  • 2 small rectal polyps: Pathology-tubular adenoma and hyperplastic    Recommendations:  • 3-year surveillance    Jose Roberto Allan M.D.

## 2023-05-11 NOTE — PROGRESS NOTES
Please let him know that he had 4 benign polyps.  Based on the number and type of polyps as well as his family history, 3-year surveillance colonoscopy recommended-put in computer for reminder

## 2023-05-15 ENCOUNTER — TELEPHONE (OUTPATIENT)
Dept: SURGERY | Facility: CLINIC | Age: 68
End: 2023-05-15
Payer: MEDICARE

## 2023-05-15 NOTE — TELEPHONE ENCOUNTER
----- Message from Jose Roberto Allan MD sent at 5/11/2023  5:41 PM EDT -----  Please let him know that he had 4 benign polyps.  Based on the number and type of polyps as well as his family history, 3-year surveillance colonoscopy recommended-put in computer for reminder

## 2023-12-23 NOTE — H&P
CC: Surveillance    HPI: 68-year-old gentleman with personal history of adenomatous polyps and family history of colon cancer (father) presents for surveillance    PMH, PSH, MEDS AND ALLERGIES reviewed and reconciled in  EPIC    PHYSICAL EXAM:  • Constitutional:  awake, alert, no acute distress  • VS: afebrile, VSS  • Respiratory:  normal inspiratory effort  • Cardiovascular: regular rate  • Gastrointestinal: Soft    ROS:  relevant systems negative other than any presenting complaints    ASSESSMENT:    Surveillance    PLAN:  Colonoscopy    Jose Roberto Allan M.D.  
No

## 2025-04-24 ENCOUNTER — TRANSCRIBE ORDERS (OUTPATIENT)
Dept: ADMINISTRATIVE | Facility: HOSPITAL | Age: 70
End: 2025-04-24
Payer: MEDICARE

## 2025-04-24 DIAGNOSIS — K76.0 FATTY LIVER: ICD-10-CM

## 2025-04-24 DIAGNOSIS — I25.10 CAD IN NATIVE ARTERY: Primary | ICD-10-CM

## 2025-05-08 ENCOUNTER — HOSPITAL ENCOUNTER (OUTPATIENT)
Dept: ULTRASOUND IMAGING | Facility: HOSPITAL | Age: 70
Discharge: HOME | End: 2025-05-08
Payer: MEDICARE

## 2025-05-08 ENCOUNTER — HOSPITAL ENCOUNTER (OUTPATIENT)
Dept: CARDIOLOGY | Facility: HOSPITAL | Age: 70
Discharge: HOME | End: 2025-05-08
Payer: MEDICARE

## 2025-05-08 DIAGNOSIS — I25.10 CAD IN NATIVE ARTERY: ICD-10-CM

## 2025-05-08 DIAGNOSIS — K76.0 FATTY LIVER: ICD-10-CM

## 2025-05-08 LAB
BH CV XLRA MEAS LEFT CAROTID BULB EDV: 18.2 CM/SEC
BH CV XLRA MEAS LEFT CAROTID BULB PSV: 81.4 CM/SEC
BH CV XLRA MEAS LEFT DIST CCA EDV: -21.7 CM/SEC
BH CV XLRA MEAS LEFT DIST CCA PSV: -82.3 CM/SEC
BH CV XLRA MEAS LEFT DIST ICA EDV: -28.6 CM/SEC
BH CV XLRA MEAS LEFT DIST ICA PSV: -90.1 CM/SEC
BH CV XLRA MEAS LEFT ICA/CCA RATIO: 1.24
BH CV XLRA MEAS LEFT MID CCA EDV: 20.9 CM/SEC
BH CV XLRA MEAS LEFT MID CCA PSV: 108.7 CM/SEC
BH CV XLRA MEAS LEFT MID ICA EDV: -38.1 CM/SEC
BH CV XLRA MEAS LEFT MID ICA PSV: -102.2 CM/SEC
BH CV XLRA MEAS LEFT PROX CCA EDV: 32.9 CM/SEC
BH CV XLRA MEAS LEFT PROX CCA PSV: 164.6 CM/SEC
BH CV XLRA MEAS LEFT PROX ECA EDV: -19.1 CM/SEC
BH CV XLRA MEAS LEFT PROX ECA PSV: -126.5 CM/SEC
BH CV XLRA MEAS LEFT PROX ICA EDV: -36.4 CM/SEC
BH CV XLRA MEAS LEFT PROX ICA PSV: -100.5 CM/SEC
BH CV XLRA MEAS LEFT PROX SCLA PSV: 153.7 CM/SEC
BH CV XLRA MEAS LEFT VERTEBRAL A EDV: -13 CM/SEC
BH CV XLRA MEAS LEFT VERTEBRAL A PSV: -58.9 CM/SEC
BH CV XLRA MEAS RIGHT CAROTID BULB EDV: 21.7 CM/SEC
BH CV XLRA MEAS RIGHT CAROTID BULB PSV: 70.2 CM/SEC
BH CV XLRA MEAS RIGHT DIST CCA EDV: -27.4 CM/SEC
BH CV XLRA MEAS RIGHT DIST CCA PSV: -98.8 CM/SEC
BH CV XLRA MEAS RIGHT DIST ICA EDV: -33.8 CM/SEC
BH CV XLRA MEAS RIGHT DIST ICA PSV: -88.4 CM/SEC
BH CV XLRA MEAS RIGHT ICA/CCA RATIO: 1.15
BH CV XLRA MEAS RIGHT MID CCA EDV: 23 CM/SEC
BH CV XLRA MEAS RIGHT MID CCA PSV: 126.2 CM/SEC
BH CV XLRA MEAS RIGHT MID ICA EDV: -34.7 CM/SEC
BH CV XLRA MEAS RIGHT MID ICA PSV: -113.5 CM/SEC
BH CV XLRA MEAS RIGHT PROX CCA EDV: 19.8 CM/SEC
BH CV XLRA MEAS RIGHT PROX CCA PSV: 131.7 CM/SEC
BH CV XLRA MEAS RIGHT PROX ECA EDV: -16.5 CM/SEC
BH CV XLRA MEAS RIGHT PROX ECA PSV: -113 CM/SEC
BH CV XLRA MEAS RIGHT PROX ICA EDV: -24.3 CM/SEC
BH CV XLRA MEAS RIGHT PROX ICA PSV: -94.4 CM/SEC
BH CV XLRA MEAS RIGHT PROX SCLA PSV: 261.5 CM/SEC
BH CV XLRA MEAS RIGHT VERTEBRAL A EDV: 25.2 CM/SEC
BH CV XLRA MEAS RIGHT VERTEBRAL A PSV: 77.9 CM/SEC
LEFT ARM BP: NORMAL MMHG
RIGHT ARM BP: NORMAL MMHG

## 2025-05-08 PROCEDURE — 76705 ECHO EXAM OF ABDOMEN: CPT

## 2025-05-08 PROCEDURE — 93880 EXTRACRANIAL BILAT STUDY: CPT

## (undated) DEVICE — CANN NASL HI FLO COMFRT SFT A/ 7FT

## (undated) DEVICE — CANN O2 ETCO2 FITS ALL CONN CO2 SMPL A/ 7IN DISP LF

## (undated) DEVICE — ADAPT CLN BIOGUARD AIR/H2O DISP

## (undated) DEVICE — SENSR O2 OXIMAX FNGR A/ 18IN NONSTR

## (undated) DEVICE — TBG 02 CRUSH RESIST LF CLR 7FT

## (undated) DEVICE — THE SINGLE USE ETRAP – POLYP TRAP IS USED FOR SUCTION RETRIEVAL OF ENDOSCOPICALLY REMOVED POLYPS.: Brand: ETRAP

## (undated) DEVICE — SNAR POLYP CAPTIVATOR RND STFF 2.4 240CM 10MM 1P/U

## (undated) DEVICE — KT ORCA ORCAPOD DISP STRL

## (undated) DEVICE — Device: Brand: DEFENDO AIR/WATER/SUCTION AND BIOPSY VALVE

## (undated) DEVICE — THE TORRENT IRRIGATION SCOPE CONNECTOR IS USED WITH THE TORRENT IRRIGATION TUBING TO PROVIDE IRRIGATION FLUIDS SUCH AS STERILE WATER DURING GASTROINTESTINAL ENDOSCOPIC PROCEDURES WHEN USED IN CONJUNCTION WITH AN IRRIGATION PUMP (OR ELECTROSURGICAL UNIT).: Brand: TORRENT

## (undated) DEVICE — TUBING, SUCTION, 1/4" X 10', STRAIGHT: Brand: MEDLINE

## (undated) DEVICE — LN SMPL CO2 SHTRM SD STREAM W/M LUER

## (undated) DEVICE — SNAR POLYP SENSATION STDOVL 27 240 BX40